# Patient Record
Sex: FEMALE | Race: OTHER
[De-identification: names, ages, dates, MRNs, and addresses within clinical notes are randomized per-mention and may not be internally consistent; named-entity substitution may affect disease eponyms.]

---

## 2021-10-16 ENCOUNTER — HOSPITAL ENCOUNTER (EMERGENCY)
Dept: HOSPITAL 26 - MED | Age: 76
Discharge: HOME | End: 2021-10-16
Payer: COMMERCIAL

## 2021-10-16 VITALS — WEIGHT: 210.13 LBS | HEIGHT: 65 IN | BODY MASS INDEX: 35.01 KG/M2

## 2021-10-16 VITALS — DIASTOLIC BLOOD PRESSURE: 76 MMHG | SYSTOLIC BLOOD PRESSURE: 184 MMHG

## 2021-10-16 DIAGNOSIS — I10: ICD-10-CM

## 2021-10-16 DIAGNOSIS — R51.9: Primary | ICD-10-CM

## 2021-10-16 DIAGNOSIS — E11.9: ICD-10-CM

## 2021-10-16 DIAGNOSIS — Z88.0: ICD-10-CM

## 2021-10-16 DIAGNOSIS — Z95.1: ICD-10-CM

## 2021-10-16 DIAGNOSIS — F41.9: ICD-10-CM

## 2021-10-16 DIAGNOSIS — Z98.890: ICD-10-CM

## 2021-10-16 NOTE — NUR
76 Y FEMALE WITH C/O HEADACHE THAT HAS LASTED FOR X3 WEEKS. PT STATED WITHIN 
THE LAST MONTH SHE HAS LAST HER COUSIN, BROTHER, AND COUSIN'S SPOUSE FROM 
[facility]. PT ALSO STATED SHE HAS RECENTLY SEPERATED FROM HER SPOUSE OF 15 YEARS. 
PT ADMITTED TO CRYING EVERYDAY AND AFTERWARDS THE HA STARTED. PT DENIES ANY 
CHEST PAIN, SOB, BLURRED VISION AT THIS TIME. PT STATED SHE HAS HAD SOME 
LIGHTHEADNESS RECENTLY. BREATH SOUNDS CLEAR BILATEARLLY, S1/S2 HEARD, AND SKIN 
INTACT



PMH: HTN



ALLERGIES: PCN Pt Call and says she wants a script for her shingles shot to get it done at [facility] in [place] she call her insurance and they said she can get it done at [facility] as long her doctors write her a script for shingle she can pick it up any time